# Patient Record
Sex: MALE | Race: BLACK OR AFRICAN AMERICAN
[De-identification: names, ages, dates, MRNs, and addresses within clinical notes are randomized per-mention and may not be internally consistent; named-entity substitution may affect disease eponyms.]

---

## 2019-11-21 ENCOUNTER — HOSPITAL ENCOUNTER (EMERGENCY)
Dept: HOSPITAL 61 - ER | Age: 39
Discharge: HOME | End: 2019-11-21
Payer: COMMERCIAL

## 2019-11-21 VITALS — HEIGHT: 64 IN | BODY MASS INDEX: 30.56 KG/M2 | WEIGHT: 179 LBS

## 2019-11-21 VITALS — SYSTOLIC BLOOD PRESSURE: 151 MMHG | DIASTOLIC BLOOD PRESSURE: 67 MMHG

## 2019-11-21 DIAGNOSIS — F12.90: ICD-10-CM

## 2019-11-21 DIAGNOSIS — J45.909: ICD-10-CM

## 2019-11-21 DIAGNOSIS — M25.521: Primary | ICD-10-CM

## 2019-11-21 PROCEDURE — 99283 EMERGENCY DEPT VISIT LOW MDM: CPT

## 2019-11-21 NOTE — PHYS DOC
Past Medical History


Past Medical History:  Asthma, P.U.D.


Past Surgical History:  No Surgical History


Alcohol Use:  Occasionally


Drug Use:  Marijuana





Adult General


Chief Complaint


Chief Complaint:  PAIN CONTROL





HPI


HPI





Patient is a 39  year old male patient who presents to the ED today complaining 

of pain to the right elbow and is requesting something for pain. Patient states 

he fractured his elbow years ago and has been using meloxicam. He is out of his 

meloxicam. Patient denies any new injury. He describes the pain is mild and 

worse on range of motion.





Review of Systems


Review of Systems





Constitutional: Denies fever or chills []





Musculoskeletal: Reports right elbow pain


Integument: Denies rash or skin lesions []


Neurologic: Denies headache, focal weakness or sensory changes []








All other systems were reviewed and found to be within normal limits, except as 

documented in this note.





Allergies


Allergies





Allergies








Coded Allergies Type Severity Reaction Last Updated Verified


 


  No Known Drug Allergies    12/22/16 No











Physical Exam


Physical Exam





Constitutional: Well developed, well nourished, no acute distress, non-toxic 

appearance. []





Skin: Warm, dry, no erythema, no rash. [] 


Back: No tenderness, no CVA tenderness. [] 


Extremities: Right elbow with no obvious deformity, full range of motion to the 

right elbow. Neurovascular exam is intact to the right elbow. +2 right radial 

pulse. Cap refill less than 2 seconds the right upper extremity.


Neurologic: Alert and oriented X 3, normal motor function, normal sensory 

function, no focal deficits noted. []


Psychologic: Affect normal, judgement normal, mood normal. []





EKG


EKG


[]





Radiology/Procedures


Radiology/Procedures


[]





Course & Med Decision Making


Course & Med Decision Making


Pertinent Labs and Imaging studies reviewed. (See chart for details)





This is a 39-year-old male patient presenting to the ED today with right elbow 

pain. Pain is chronic, ran out of meloxicam. Prescription given. Discharged to 

home.





Dragon Disclaimer


Dragon Disclaimer


This electronic medical record was generated, in whole or in part, using a voice

 recognition dictation system.





Departure


Departure


Impression:  


   Primary Impression:  


   Right elbow pain


Disposition:  01 HOME, SELF-CARE


Condition:  STABLE


Referrals:  


LEONIE PATINO BC (PCP)


follow up in 1-2 weeks


Patient Instructions:  Musculoskeletal Pain





Additional Instructions:  


You were evaluated in the emergency room for right elbow pain that is chronic. 

Take the prescribed medication as needed for pain. Follow-up with your doctor in

 1-2 weeks


Scripts


Meloxicam (MELOXICAM) 7.5 Mg Tablet


1 TAB PO DAILY for 30 Days, #30 TAB 0 Refills


   Prov: JOSE RAUL BARRAZA         11/21/19 


Gabapentin (GABAPENTIN **) 300 Mg Capsule


300 MG PO TID for NEUROGENIC PAIN, #30 CAP


   Prov: JOSE RAUL BARRAZA         11/21/19











JOSE RAUL BARRAZA              Nov 21, 2019 11:53

## 2021-02-11 ENCOUNTER — HOSPITAL ENCOUNTER (EMERGENCY)
Dept: HOSPITAL 61 - ER | Age: 41
Discharge: HOME | End: 2021-02-11
Payer: COMMERCIAL

## 2021-02-11 VITALS — DIASTOLIC BLOOD PRESSURE: 80 MMHG | SYSTOLIC BLOOD PRESSURE: 126 MMHG

## 2021-02-11 VITALS — WEIGHT: 194.01 LBS | HEIGHT: 64 IN | BODY MASS INDEX: 33.12 KG/M2

## 2021-02-11 DIAGNOSIS — K21.9: ICD-10-CM

## 2021-02-11 DIAGNOSIS — J45.909: ICD-10-CM

## 2021-02-11 DIAGNOSIS — M72.2: Primary | ICD-10-CM

## 2021-02-11 PROCEDURE — 73630 X-RAY EXAM OF FOOT: CPT

## 2021-02-11 PROCEDURE — 99283 EMERGENCY DEPT VISIT LOW MDM: CPT

## 2021-02-11 NOTE — PHYS DOC
Past Medical History


Past Medical History:  Asthma, GERD, Other


Additional Past Medical Histor:  Bilat foot pain


Past Surgical History:  No Surgical History


Smoking Status:  Never Smoker


Alcohol Use:  Occasionally


Drug Use:  Marijuana





General Adult


EDM:


Chief Complaint:  FOOT INJURY PAIN





HPI:


HPI:





Patient is a 40  year old male with no significant medical history who presents 

to the ED today complaining of 7 out of 10  intermittent left heel pain, 

symptoms began this morning after being on his feet for 11 hours at work.  

Patient denies any known injury.  States the pain is sharp and worse on standing

on his left foot.  States pain is relieved being off his feet.





Review of Systems:


Review of Systems:


Constitutional:   Denies fever or chills. []


Musculoskeletal:   Reports left heel pain.  Denies back pain 


Integument:   Denies rash. [] 


Neurologic:   Denies headache, focal weakness or sensory changes. [] 


Psychiatric:  Denies depression or anxiety. []





Heart Score:


Risk Factors:


Risk Factors:  DM, Current or recent (<one month) smoker, HTN, HLP, family 

history of CAD, obesity.


Risk Scores:


Score 0 - 3:  2.5% MACE over next 6 weeks - Discharge Home


Score 4 - 6:  20.3% MACE over next 6 weeks - Admit for Clinical Observation


Score 7 - 10:  72.7% MACE over next 6 weeks - Early Invasive Strategies





Allergies:


Allergies:





Allergies








Coded Allergies Type Severity Reaction Last Updated Verified


 


  No Known Drug Allergies    12/22/16 No











Physical Exam:


PE:





Constitutional: Well developed, well nourished, no acute distress, non-toxic 

appearance. []


Skin: Warm, dry, no erythema, no rash. [] 


Back: No tenderness, no CVA tenderness. [] 


Extremities: Left foot with no obvious deformity, no edema, no ecchymosis, no 

navicular bone tenderness or tenderness on the base of the fifth metatarsal.  No

 tenderness on exam.  Full range of motion to the left foot.  Full range of 

motion to the left toes.  +2 left pedal pulse.  Cap refill less than 2 seconds 

to left toes.


Neurologic: Alert and oriented X 3, normal motor function, normal sensory 

function, no focal deficits noted. []


Psychologic: Affect normal, judgement normal, mood normal. []





Current Patient Data:


Vital Signs:





                                   Vital Signs








  Date Time  Temp Pulse Resp B/P (MAP) Pulse Ox O2 Delivery O2 Flow Rate FiO2


 


2/11/21 12:22 98.2 72 16 126/80 (95) 96 Room Air  





 98.2       











EKG:


EKG:


[]





Radiology/Procedures:


Radiology/Procedures:


[]PROCEDURE: FOOT RIGHT 3V





XR FOOT_RIGHT 3 VIEWS 





DATE: 2/11/2021 12:18 PM





INDICATION:  pain plantar surface of right foot, no known injury   





COMPARISON:  None.





FINDINGS:





Bones: There is no evidence of acute fracture or dislocation. Posterior 

calcaneal enthesophyte.





Joints: The joint spaces are normal.  





Miscellaneous: None.





IMPRESSION:  





No evidence of acute fracture.





Electronically signed by: Aster Senior MD (2/11/2021 12:39 PM) [id number]














DICTATED and SIGNED BY:     ASTER SENIOR MD


DATE:     02/11/21 [id number]





Course & Med Decision Making:


Course & Med Decision Making


Pertinent Labs and Imaging studies reviewed. (See chart for details)





This is a 40-year-old male patient presented to the ED today this morning after 

being on his feet for 11 hours.  Left foot x-rays interpreted by radiologist are

 negative for any acute findings.  I Suspect this patient could have plantar 

fasciitis.  Recommended insoles for his work shoes.  Recommended NSAIDs.  

Follow-up with primary care doctor or orthopedic doctor in 1 week.





Dragon Disclaimer:


Dragon Disclaimer:


This electronic medical record was generated, in whole or in part, using a voice

 recognition dictation system.





Departure


Departure


Impression:  


   Primary Impression:  


   Plantar fasciitis of left foot


Disposition:  01 DC HOME SELF CARE/HOMELESS


Condition:  STABLE


Referrals:  


LEONIE PATINO BC (PCP)


Follow-up in 1 to 2 weeks





BIN FERRARI DPM


Follow-up in 1 to 2 weeks


Patient Instructions:  Plantar Fasciitis





Additional Instructions:  


You were seen in the emergency room for left leg pain. Your left foot x-rays are

 negative for any acute findings.  As discussed consider getting insoles for 

your shoes.  You can try to ice elevate the extremities.  Take the prescribed 

pain medicine as needed for pain.  You can follow-up with the provided 

podiatrist in 1 to 2 weeks or your own doctor.


Scripts


Diclofenac Potassium (DICLOFENAC POTASSIUM) 50 Mg Tablet


1 TAB PO BID PRN for PAIN, #20 TAB 1 Refill


   Prov: MUTUNGA,JOSE RAUL MERCHANT         2/11/21











JOSE RAUL BARRAZA              Feb 11, 2021 12:51

## 2021-02-11 NOTE — RAD
XR FOOT_RIGHT 3 VIEWS 



DATE: 2/11/2021 12:18 PM



INDICATION:  pain plantar surface of right foot, no known injury   



COMPARISON:  None.



FINDINGS:



Bones: There is no evidence of acute fracture or dislocation. Posterior calcaneal enthesophyte.



Joints: The joint spaces are normal.  



Miscellaneous: None.



IMPRESSION:  



No evidence of acute fracture.



Electronically signed by: Prince Senior MD (2/11/2021 12:39 PM) [id number]